# Patient Record
Sex: MALE | Race: WHITE | NOT HISPANIC OR LATINO | Employment: OTHER | URBAN - METROPOLITAN AREA
[De-identification: names, ages, dates, MRNs, and addresses within clinical notes are randomized per-mention and may not be internally consistent; named-entity substitution may affect disease eponyms.]

---

## 2021-02-06 ENCOUNTER — NEW PATIENT COMPREHENSIVE (OUTPATIENT)
Dept: URBAN - METROPOLITAN AREA CLINIC 38 | Facility: CLINIC | Age: 51
End: 2021-02-06

## 2021-02-06 DIAGNOSIS — H52.223: ICD-10-CM

## 2021-02-06 DIAGNOSIS — H52.13: ICD-10-CM

## 2021-02-06 DIAGNOSIS — H35.721: ICD-10-CM

## 2021-02-06 DIAGNOSIS — I10: ICD-10-CM

## 2021-02-06 DIAGNOSIS — H04.123: ICD-10-CM

## 2021-02-06 PROCEDURE — 92134 CPTRZ OPH DX IMG PST SGM RTA: CPT

## 2021-02-06 PROCEDURE — 92015 DETERMINE REFRACTIVE STATE: CPT

## 2021-02-06 PROCEDURE — 99204 OFFICE O/P NEW MOD 45 MIN: CPT

## 2021-02-06 ASSESSMENT — VISUAL ACUITY
OD_SC: J4
OS_CC: 20/20
OD_CC: 20/20
OU_CC: 20/20-1
OS_SC: 20/20-1
OU_SC: J2
OD_SC: 20/25
OU_SC: 20/20
OS_SC: J4

## 2021-02-06 ASSESSMENT — KERATOMETRY
OD_K2POWER_DIOPTERS: 43.25
OD_AXISANGLE_DEGREES: 10
OD_K1POWER_DIOPTERS: 43.75
OS_K2POWER_DIOPTERS: 43.25
OD_AXISANGLE2_DEGREES: 100
OS_AXISANGLE2_DEGREES: 85
OS_K1POWER_DIOPTERS: 43.50
OS_AXISANGLE_DEGREES: 175

## 2021-02-06 ASSESSMENT — TONOMETRY
OS_IOP_MMHG: 15
OD_IOP_MMHG: 15

## 2021-03-06 ENCOUNTER — DILATED FUNDUS EXAM (OUTPATIENT)
Dept: URBAN - METROPOLITAN AREA CLINIC 38 | Facility: CLINIC | Age: 51
End: 2021-03-06

## 2021-03-06 DIAGNOSIS — H04.123: ICD-10-CM

## 2021-03-06 DIAGNOSIS — I10: ICD-10-CM

## 2021-03-06 DIAGNOSIS — H35.721: ICD-10-CM

## 2021-03-06 PROCEDURE — 99213 OFFICE O/P EST LOW 20 MIN: CPT

## 2021-03-06 PROCEDURE — 92134 CPTRZ OPH DX IMG PST SGM RTA: CPT

## 2021-03-06 ASSESSMENT — KERATOMETRY
OS_K1POWER_DIOPTERS: 43.50
OD_AXISANGLE_DEGREES: 10
OS_AXISANGLE_DEGREES: 175
OD_K1POWER_DIOPTERS: 43.75
OS_AXISANGLE2_DEGREES: 85
OD_AXISANGLE2_DEGREES: 100
OD_K2POWER_DIOPTERS: 43.25
OS_K2POWER_DIOPTERS: 43.25

## 2021-03-06 ASSESSMENT — VISUAL ACUITY
OD_SC: J3
OD_SC: 20/40
OS_SC: 20/25
OD_CC: 20/20
OS_SC: J5
OS_CC: 20/20

## 2021-03-06 ASSESSMENT — TONOMETRY
OS_IOP_MMHG: 15
OD_IOP_MMHG: 17

## 2024-01-29 NOTE — PATIENT DISCUSSION
----- Message from Sandeep Jauregui MD sent at 1/26/2024  8:00 PM EST -----  Renal liver good  Uric acid ,thyroid good  Sugar high add A1c     No retinal holes or tears seen on exam. Recommended OBSERVATION. We reviewed the signs and symptoms of retinal tear/retinal detachment and the importance of prompt evaluation should there be increasing floaters, new flashing lights, or decreasing peripheral vision in either eye at any time. Patient understands condition, prognosis and need for follow up care.

## 2025-06-03 NOTE — PATIENT DISCUSSION
Health Maintenance       COVID-19 Vaccine (3 - 2024-25 season)  Overdue since 9/1/2024    Varicella Vaccine (1 of 2 - 13+ 2-dose series)  Never done    Hepatitis B Vaccine (1 of 3 - 19+ 3-dose series)  Never done           Following review of the above:  Patient is not proceeding with: COVID-19, Hep B, and Varicella    Note: Refer to final orders and clinician documentation.       ERM does NOT APPEAR VISUALLY SIGNIFICANT.